# Patient Record
Sex: FEMALE | NOT HISPANIC OR LATINO
[De-identification: names, ages, dates, MRNs, and addresses within clinical notes are randomized per-mention and may not be internally consistent; named-entity substitution may affect disease eponyms.]

---

## 2019-05-28 ENCOUNTER — APPOINTMENT (OUTPATIENT)
Dept: SURGERY | Facility: CLINIC | Age: 84
End: 2019-05-28
Payer: MEDICARE

## 2019-05-28 VITALS
BODY MASS INDEX: 28.55 KG/M2 | HEIGHT: 62 IN | WEIGHT: 155.13 LBS | SYSTOLIC BLOOD PRESSURE: 182 MMHG | HEART RATE: 88 BPM | OXYGEN SATURATION: 99 % | DIASTOLIC BLOOD PRESSURE: 69 MMHG | TEMPERATURE: 97.5 F

## 2019-05-28 DIAGNOSIS — K43.2 INCISIONAL HERNIA W/OUT OBSTRUCTION OR GANGRENE: ICD-10-CM

## 2019-05-28 PROCEDURE — 99213 OFFICE O/P EST LOW 20 MIN: CPT

## 2019-05-28 NOTE — PHYSICAL EXAM
[Abdominal Masses] : No abdominal masses [Abdomen Tenderness] : ~T ~M No abdominal tenderness [Alert] : alert [Oriented to Place] : oriented to place [Oriented to Person] : oriented to person [Oriented to Time] : oriented to time [Calm] : calm [de-identified] : The patient is alert, well-groomed, and cheerful. [de-identified] :    anicteric.  Nasal mucosa pink, septum midline. Oral mucosa pink.  Tongue midline, Pharynx without exudates. [de-identified] : PATRICIA [de-identified] :  3 reducible incisional  hernias.  The defects appears to be relatively large and the skin overlying the hernias  is normal.  [de-identified] : multiple abdominal scars

## 2019-05-28 NOTE — HISTORY OF PRESENT ILLNESS
[de-identified] : This is a 87 year old patient  with the chief complaint of having  multiple abdominal  hernias.  Patient reports having this condition for many years. patient had multiple abdominal surgeries . she  denies fever, nausea, vomiting, distension, night sweats and  loss of appetite.  Symptoms aggravated by cough and straining.  Reports  frequent constipations. \par  On examination patient was found to have 3 reducible incisional  hernias.  The defects appears to be relatively large and the skin overlying the hernias  is normal.

## 2019-05-28 NOTE — ASSESSMENT
[FreeTextEntry1] : This is a 87 year old patient with history   multiple abdominal surgeries presenting today   with the chief complaint of having  multiple abdominal  hernias.  Patient has this  condition for many years.  patient advised against surgery. patient advised to lose weight, to wear and abdominal binder, not to lift heavy objects and prevent constipations.  patient and her son agree with the plan.

## 2019-10-21 ENCOUNTER — APPOINTMENT (OUTPATIENT)
Dept: ORTHOPEDIC SURGERY | Facility: CLINIC | Age: 84
End: 2019-10-21

## 2019-11-04 ENCOUNTER — APPOINTMENT (OUTPATIENT)
Dept: SURGERY | Facility: CLINIC | Age: 84
End: 2019-11-04
Payer: MEDICARE

## 2019-11-04 VITALS
DIASTOLIC BLOOD PRESSURE: 76 MMHG | WEIGHT: 152 LBS | HEIGHT: 62 IN | SYSTOLIC BLOOD PRESSURE: 159 MMHG | BODY MASS INDEX: 27.97 KG/M2 | TEMPERATURE: 98.5 F | HEART RATE: 81 BPM | OXYGEN SATURATION: 97 %

## 2019-11-04 DIAGNOSIS — K43.2 INCISIONAL HERNIA W/OUT OBSTRUCTION OR GANGRENE: ICD-10-CM

## 2019-11-04 PROCEDURE — 99213 OFFICE O/P EST LOW 20 MIN: CPT

## 2019-11-04 NOTE — HISTORY OF PRESENT ILLNESS
[de-identified] : This is a 87 year old patient  with the chief complaint of having left groin pain . patient was seen by Dr Armando for   multiple incisional hernias .   patient was advised  against surgery and advised to wear abdominal binder and avoid heavy lifting and constipations.  today she  denies fever, nausea, vomiting, distension, night sweats and  loss of appetite.  Symptoms aggravated by cough and straining. she denies constipations. appetite is normal and weight is stable.

## 2019-11-04 NOTE — PLAN
[FreeTextEntry1] : Patient was told significance of findings, options, risks and benefits were explained.     All surgical options were discussed including non-surgical treatments.  patient was advised against surgery.  patient was advised to take Tylenol. Patient advised to seek immediate medical attention with any acute change in symptoms or with the development of any new or worsening symptoms.  Patient's questions and concerns addressed to patient's satisfaction, and patient verbalized an understanding of the information discussed.\par \par

## 2019-11-04 NOTE — REVIEW OF SYSTEMS
[Recent Weight Loss (___ Lbs)] : recent [unfilled] ~Ulb weight loss [As Noted in HPI] : as noted in HPI [Arthralgias] : arthralgias [Joint Pain] : joint pain [Joint Swelling] : joint swelling [Joint Stiffness] : joint stiffness [Limb Pain] : limb pain [Limb Swelling] : no limb swelling [Negative] : Heme/Lymph

## 2019-11-04 NOTE — PHYSICAL EXAM
[Abdominal Masses] : No abdominal masses [Abdomen Tenderness] : ~T ~M No abdominal tenderness [Alert] : alert [Oriented to Person] : oriented to person [Oriented to Place] : oriented to place [Oriented to Time] : oriented to time [Calm] : calm [de-identified] : The patient is alert, well-groomed, and cheerful. [de-identified] :    anicteric.  Nasal mucosa pink, septum midline. Oral mucosa pink.  Tongue midline, Pharynx without exudates. [de-identified] :  3 reducible incisional  hernias.  The defects appears to be relatively large and the skin overlying the hernias  is normal. no palpable inguinal hernias  [de-identified] : multiple abdominal scars  [de-identified] : PATRICIA